# Patient Record
Sex: FEMALE | Employment: UNEMPLOYED
[De-identification: names, ages, dates, MRNs, and addresses within clinical notes are randomized per-mention and may not be internally consistent; named-entity substitution may affect disease eponyms.]

---

## 2024-02-19 ENCOUNTER — TRANSCRIBE ORDERS (OUTPATIENT)
Dept: OTHER | Age: 34
End: 2024-02-19

## 2024-02-19 DIAGNOSIS — Z31.41 FERTILITY TESTING: Primary | ICD-10-CM

## 2024-03-10 ENCOUNTER — HEALTH MAINTENANCE LETTER (OUTPATIENT)
Age: 34
End: 2024-03-10

## 2024-07-09 ENCOUNTER — TRANSCRIBE ORDERS (OUTPATIENT)
Dept: ORTHOPEDICS | Facility: CLINIC | Age: 34
End: 2024-07-09

## 2024-07-09 DIAGNOSIS — M54.2 NECK PAIN: Primary | ICD-10-CM

## 2024-07-23 ENCOUNTER — APPOINTMENT (OUTPATIENT)
Dept: URBAN - METROPOLITAN AREA CLINIC 259 | Age: 34
Setting detail: DERMATOLOGY
End: 2024-07-24

## 2024-07-23 DIAGNOSIS — B07.8 OTHER VIRAL WARTS: ICD-10-CM

## 2024-07-23 PROCEDURE — 17110 DESTRUCT B9 LESION 1-14: CPT

## 2024-07-23 PROCEDURE — OTHER SEPARATE AND IDENTIFIABLE DOCUMENTATION: OTHER

## 2024-07-23 PROCEDURE — OTHER PRESCRIPTION MEDICATION MANAGEMENT: OTHER

## 2024-07-23 PROCEDURE — OTHER PRESCRIPTION: OTHER

## 2024-07-23 PROCEDURE — OTHER MIPS QUALITY: OTHER

## 2024-07-23 PROCEDURE — OTHER COUNSELING: OTHER

## 2024-07-23 PROCEDURE — OTHER BENIGN DESTRUCTION: OTHER

## 2024-07-23 PROCEDURE — 99203 OFFICE O/P NEW LOW 30 MIN: CPT | Mod: 25

## 2024-07-23 RX ORDER — IMIQUIMOD 12.5 MG/.25G
CREAM TOPICAL
Qty: 24 | Refills: 2 | Status: ERX | COMMUNITY
Start: 2024-07-23

## 2024-07-23 ASSESSMENT — LOCATION DETAILED DESCRIPTION DERM
LOCATION DETAILED: LEFT MIDDLE FINGER DISTAL INTERPHALANGEAL JOINT
LOCATION DETAILED: LEFT DORSAL MIDDLE FINGER METACARPOPHALANGEAL JOINT
LOCATION DETAILED: LEFT PROXIMAL DORSAL MIDDLE FINGER

## 2024-07-23 ASSESSMENT — LOCATION SIMPLE DESCRIPTION DERM
LOCATION SIMPLE: LEFT HAND
LOCATION SIMPLE: LEFT MIDDLE FINGER

## 2024-07-23 ASSESSMENT — LOCATION ZONE DERM
LOCATION ZONE: HAND
LOCATION ZONE: FINGER

## 2024-07-23 NOTE — HPI: WARTS (VERRUCA)
How Severe Are Your Warts?: mild
Is This A New Presentation, Or A Follow-Up?: Warts
Treatment Number (Optional): 1
Additional History: Pt says her PCP froze the wart previously and it peeled off but came back. Pt requesting additional tx for the warts.

## 2024-07-23 NOTE — PROCEDURE: PRESCRIPTION MEDICATION MANAGEMENT
Plan: RTC in 1 month
Initiate Treatment: imiquimod 5 % topical cream packet QHS
Detail Level: Zone
Render In Strict Bullet Format?: No

## 2024-07-23 NOTE — PROCEDURE: BENIGN DESTRUCTION
Treatment Number (Will Not Render If 0): 0
Consent: The patient's consent was obtained including but not limited to risks of crusting, scabbing, blistering, scarring, darker or lighter pigmentary change, recurrence, incomplete removal and infection.
Medical Necessity Information: It is in your best interest to select a reason for this procedure from the list below. All of these items fulfill various CMS LCD requirements except the new and changing color options.
Add 52 Modifier (Optional): no
Anesthesia Volume In Cc: 0.5
Medical Necessity Clause: This procedure was medically necessary because the lesions that were treated were:
Render Post-Care Instructions In Note?: yes
Post-Care Instructions: I reviewed with the patient in detail post-care instructions. Patient is to wear sunprotection, and avoid picking at any of the treated lesions. Pt may apply Vaseline to crusted or scabbing areas.
Detail Level: Detailed

## 2024-09-20 ENCOUNTER — APPOINTMENT (OUTPATIENT)
Dept: URBAN - METROPOLITAN AREA CLINIC 259 | Age: 34
Setting detail: DERMATOLOGY
End: 2024-09-20

## 2024-09-20 ENCOUNTER — RX ONLY (RX ONLY)
Age: 34
End: 2024-09-20

## 2024-09-20 DIAGNOSIS — B07.8 OTHER VIRAL WARTS: ICD-10-CM

## 2024-09-20 PROCEDURE — OTHER BENIGN DESTRUCTION: OTHER

## 2024-09-20 PROCEDURE — OTHER PRESCRIPTION MEDICATION MANAGEMENT: OTHER

## 2024-09-20 PROCEDURE — OTHER COUNSELING: OTHER

## 2024-09-20 PROCEDURE — OTHER MIPS QUALITY: OTHER

## 2024-09-20 PROCEDURE — 17110 DESTRUCT B9 LESION 1-14: CPT

## 2024-09-20 RX ORDER — IMIQUIMOD 12.5 MG/.25G
CREAM TOPICAL
Qty: 24 | Refills: 1 | Status: ERX

## 2024-09-20 ASSESSMENT — LOCATION ZONE DERM
LOCATION ZONE: HAND
LOCATION ZONE: FINGER

## 2024-09-20 ASSESSMENT — LOCATION SIMPLE DESCRIPTION DERM
LOCATION SIMPLE: LEFT HAND
LOCATION SIMPLE: LEFT MIDDLE FINGER

## 2024-09-20 ASSESSMENT — LOCATION DETAILED DESCRIPTION DERM
LOCATION DETAILED: LEFT DORSAL MIDDLE FINGER METACARPOPHALANGEAL JOINT
LOCATION DETAILED: LEFT PROXIMAL DORSAL MIDDLE FINGER
LOCATION DETAILED: LEFT MIDDLE FINGER DISTAL INTERPHALANGEAL JOINT

## 2024-09-20 NOTE — PROCEDURE: PRESCRIPTION MEDICATION MANAGEMENT
Plan: RTC in 1 month
Detail Level: Zone
Continue Regimen: imiquimod 5 % topical cream packet QHS
Render In Strict Bullet Format?: No

## 2024-10-24 ENCOUNTER — MEDICAL CORRESPONDENCE (OUTPATIENT)
Dept: HEALTH INFORMATION MANAGEMENT | Facility: CLINIC | Age: 34
End: 2024-10-24
Payer: COMMERCIAL

## 2024-11-19 ENCOUNTER — THERAPY VISIT (OUTPATIENT)
Dept: PHYSICAL THERAPY | Facility: CLINIC | Age: 34
End: 2024-11-19
Attending: ORTHOPAEDIC SURGERY
Payer: COMMERCIAL

## 2024-11-19 DIAGNOSIS — M54.2 NECK PAIN: Primary | ICD-10-CM

## 2024-11-19 PROCEDURE — 97530 THERAPEUTIC ACTIVITIES: CPT | Mod: GP | Performed by: PHYSICAL THERAPIST

## 2024-11-19 PROCEDURE — 97161 PT EVAL LOW COMPLEX 20 MIN: CPT | Mod: GP | Performed by: PHYSICAL THERAPIST

## 2024-11-19 PROCEDURE — 97110 THERAPEUTIC EXERCISES: CPT | Mod: GP | Performed by: PHYSICAL THERAPIST

## 2024-11-19 NOTE — PROGRESS NOTES
PHYSICAL THERAPY EVALUATION  Type of Visit: Evaluation       Fall Risk Screen:  Fall screen completed by: PT  Have you fallen 2 or more times in the past year?: No  Have you fallen and had an injury in the past year?: No  Is patient a fall risk?: No    Subjective         Presenting condition or subjective complaint: Pain and tightness in the neck and shoulders since 10/2015  Date of onset:      Relevant medical history: Arthritis; Migraines or headaches; Osteoporosis; Pain at night or rest; Sleep disorder like apnea   Dates & types of surgery: CTS release L hand    Prior diagnostic imaging/testing results: MRI; CT scan; X-ray     Prior therapy history for the same diagnosis, illness or injury: No      Prior Level of Function  Transfers: Independent  Ambulation: Independent  ADL: Independent  IADL: Childcare, Housekeeping    Living Environment  Social support: With family members   Type of home: [facility]   Stairs to enter the home: Yes 2     Ramp:     Stairs inside the home: Yes 16 Is there a railing: No     Help at home: Self Cares (home health aide/personal care attendant, family, etc)  Equipment owned:       Employment: Yes    Hobbies/Interests:      Patient goals for therapy: reduce pain and stop numbness and tingling    Pain assessment: See objective evaluation for additional pain details     Objective   CERVICAL SPINE EVALUATION   Functional disability score (NDI):  42.22  VAS score (0-10): 7/10; at worst 8/10  Patient is R hand dominate    ADDITIONAL HISTORY:  Present symptoms:  B neck pain with B UT/posterior shoulder symptoms (L > R) with symptoms into the L scapula, into the L UE to dorsum of the wrist.  Intermittent L sided headaches.  Pain quality: Numb, Tingling, and heaviness  Paresthesia (yes/no):  no    Symptoms (improving/unchanging/worsening):  worsening over the past 2 months.    Symptoms commenced as a result of: No specific incident but has 3 special needs children.  In 2015, she fell on the ice  when pushing a wheel chair     Constant symptoms (neck/arm/forearm/headache): neck, shoulders,L UE/dorsum of L wrist, scapula  Intermittent symptoms (neck/arm/forearm/headache): headache    Symptoms are made worse with the following: Always Bending, Always, worse to the L  Turning, Always, supine  Lying, Always Rising, time of day - Always AM, Always On the move, and lifting, carrying, exerting herself   Symptoms are made better with the following: Sometimes When still, wearing her wrist brace, Ibuprofen, hot or cold shower    Disturbed sleep (yes/no): yes  Number of pillows: 1  Sleeping postures (prone/sup/side R/L): sides    Previous treatments: injection (beginning of Nov/end of Oct), muscle relaxant    Specific Questions: (as reported by the patient)  Dizziness/Tinnitus/Nausea/Swallowing (pos/neg): no  Gait/Upper Limbs (normal/abnormal): normal  Medications (nil/NSAIDS/anlag/steroids/anticoag/other):  NSAIDS, Muscle relaxants  General health (excellent/good/fair/poor):  good  Pertinent medical history:  anxiety, sleep disturbance, Osteoporosis, diabetes, low back pain   Imaging (None/Xray/MRI/Other):  MRI - disc involvement at C 4-5; EMG 2016  Recent or major surgery (yes/no): L hand surgery for CTS 2016  Night pain (yes/no): wakes at night due to neck pain and for other issues, too  Accidents (yes/no): not recent  Unexplained weight loss (yes/no): working on losing weight  Barriers at home: no  Other red flags: no    Postural Observation:   Sitting: Neutral  Protruded head: No Lateral deviation:  Nil.  Change of posture: No effect Wry Neck: Nil  Other observations/functional baselines:  increased cervical lordosis    Neurological:  Motor Deficit:  strong and symmetrical B UEs   Reflexes:  NT  Sensory Deficit:  NT     Neurodynamic tests:  Tightness for the R UE and pain L neck for ULTT on Rt.  (-) on L with L neck pain     Movement Loss:   Raza Mod Min Nil Symptoms   Protrusion    x Chest symptoms produced    Flexion    x L neck and UT   Retraction   x x chest symptoms produced   Extension   x  Central lower neck pain   Lateral flexion R    x Pull on L   Lateral flexion L   x x Inc L neck UT   Rotation R  x   Pull L UT   Rotation L  x   Pain L neck and shoulder     Test Movements:   During: produces, abolishes, increases, decreases, no effect, centralizing, peripheralizing  After: better, worse, no better, no worse, no effect, centralized, peripheralized    Symptomatic response Mechanical response    During testing After testing Effect - increased ROM, decreased ROM, or key functional test No Effect   Pretest symptoms sittin/10 neck B UT/posterior shoulders, L scapula, dorsal L wrist     Rep PRO       Rep RET increased neck, L UT/shoulder, ant chest  No Worse    Slight inc rotation R, no effect with L rotation     Rep RET EXT Increases    Worse     x            Pretest symptoms lying:     During testing After testing Effect - increased ROM, decreased ROM, or key functional test No Effect   Rep RET       Rep RET EXT         If required, pretest symptoms sitting:      During testing After testing Effect - increased ROM, decreased ROM, or key functional test No Effect   Rep LF-R       Rep LF-L       Rep ROT-R       Rep ROT-L       Rep FLEX         Static Tests:       Other Tests: not assessed    Provisional Classification:  Inconclusive/Other - Mechanically Inconclusive versus derangement (need to further assess at next session)    Potential Drivers of Pain and/or Disability: Comorbidities, duration of symptoms    Principle of Management:  Education:  discussed trial of using a rolled towel to support the neck when sleeping.  Discussed discontinue exercise if increase in L UE symptoms    Equipment provided:  none but discussed use of rolled towel to support neck when sleeping.  Mechanical therapy (Y/N):  yes   Extension principle:  repeated retraction 10 reps, goal 6-8 times a day   Lateral principle:  Flexion  principle:     Other:      Assessment & Plan   CLINICAL IMPRESSIONS  Medical Diagnosis: neck pain    Treatment Diagnosis: neck pain   Impression/Assessment: Patient is a 34 year old female with neck complaints.  The following significant findings have been identified: Pain, Decreased ROM/flexibility, Impaired muscle performance, Decreased activity tolerance, and Impaired posture. These impairments interfere with their ability to perform self care tasks, household chores, driving , and   as compared to previous level of function.     Clinical Decision Making (Complexity):  Clinical Presentation: Evolving/Changing  Clinical Presentation Rationale: based on medical and personal factors listed in PT evaluation  Clinical Decision Making (Complexity): Low complexity    PLAN OF CARE  Treatment Interventions:  Interventions: Manual Therapy, Neuromuscular Re-education, Therapeutic Activity, Therapeutic Exercise, Self-Care/Home Management    Long Term Goals     PT Goal 1  Goal Identifier: rotation  Goal Description: patient will be able to look over either shoulder to 75 deg without pain in the neck or shoulders  Rationale: to maximize safety and independence with performance of ADLs and functional tasks;to maximize safety and independence within the home;to maximize safety and independence within the community;to maximize safety and independence with transportation  Goal Progress: L rotation is more limited than R rotation; about 65 deg rot R; 60 deg L with pain on the L  Target Date: 01/14/25      Frequency of Treatment: 1 time a week  Duration of Treatment: 8 weeks    Recommended Referrals to Other Professionals:   Education Assessment:   Learner/Method: Patient;Pictures/Video;Demonstration;Reading;Listening  Education Comments: PTRx on phone    Risks and benefits of evaluation/treatment have been explained.   Patient/Family/caregiver agrees with Plan of Care.     Evaluation Time:     PT Eval, Low Complexity  Minutes (26810): 20  Evaluation Only     Signing Clinician: Ladonna Hernandez, PT        [facility]                                                                                   OUTPATIENT PHYSICAL THERAPY      PLAN OF TREATMENT FOR OUTPATIENT REHABILITATION   Patient's Last Name, First Name, Cher Gomes YOB: 1990   Provider's Name   [facility]   Medical Record No.  [id number]     Onset Date:    Start of Care Date: 11/19/24     Medical Diagnosis:  neck pain      PT Treatment Diagnosis:  neck pain Plan of Treatment  Frequency/Duration: 1 time a week/ 8 weeks    Certification date from 11/19/24 to 01/14/25         See note for plan of treatment details and functional goals     Ladonna Hernandez, PT                         I CERTIFY THE NEED FOR THESE SERVICES FURNISHED UNDER        THIS PLAN OF TREATMENT AND WHILE UNDER MY CARE .             Physician Signature               Date    X_____________________________________________________                  Referring Provider:  Gallito Manning    Initial Assessment  See Epic Evaluation- Start of Care Date: 11/19/24

## 2024-12-10 ENCOUNTER — THERAPY VISIT (OUTPATIENT)
Dept: PHYSICAL THERAPY | Facility: CLINIC | Age: 34
End: 2024-12-10
Payer: COMMERCIAL

## 2024-12-10 DIAGNOSIS — M54.2 NECK PAIN: Primary | ICD-10-CM

## 2024-12-10 PROCEDURE — 97110 THERAPEUTIC EXERCISES: CPT | Mod: GP | Performed by: PHYSICAL THERAPY ASSISTANT

## 2025-01-20 ENCOUNTER — MEDICAL CORRESPONDENCE (OUTPATIENT)
Dept: HEALTH INFORMATION MANAGEMENT | Facility: CLINIC | Age: 35
End: 2025-01-20
Payer: COMMERCIAL

## 2025-01-27 ENCOUNTER — PATIENT OUTREACH (OUTPATIENT)
Dept: CARE COORDINATION | Facility: CLINIC | Age: 35
End: 2025-01-27
Payer: COMMERCIAL

## 2025-01-29 ENCOUNTER — PATIENT OUTREACH (OUTPATIENT)
Dept: CARE COORDINATION | Facility: CLINIC | Age: 35
End: 2025-01-29
Payer: COMMERCIAL

## 2025-01-30 ENCOUNTER — HOSPITAL ENCOUNTER (EMERGENCY)
Facility: CLINIC | Age: 35
Discharge: HOME | End: 2025-01-30
Attending: STUDENT IN AN ORGANIZED HEALTH CARE EDUCATION/TRAINING PROGRAM
Payer: COMMERCIAL

## 2025-01-30 ENCOUNTER — APPOINTMENT (OUTPATIENT)
Dept: ULTRASOUND IMAGING | Facility: CLINIC | Age: 35
End: 2025-01-30
Attending: STUDENT IN AN ORGANIZED HEALTH CARE EDUCATION/TRAINING PROGRAM
Payer: COMMERCIAL

## 2025-01-30 VITALS
OXYGEN SATURATION: 99 % | SYSTOLIC BLOOD PRESSURE: 110 MMHG | RESPIRATION RATE: 17 BRPM | WEIGHT: 196.21 LBS | DIASTOLIC BLOOD PRESSURE: 74 MMHG | BODY MASS INDEX: 33.5 KG/M2 | HEART RATE: 73 BPM | TEMPERATURE: 98.8 F | HEIGHT: 64 IN

## 2025-01-30 DIAGNOSIS — K29.50 CHRONIC GASTRITIS WITHOUT BLEEDING, UNSPECIFIED GASTRITIS TYPE: ICD-10-CM

## 2025-01-30 DIAGNOSIS — K80.20 SYMPTOMATIC CHOLELITHIASIS: ICD-10-CM

## 2025-01-30 DIAGNOSIS — R10.10 UPPER ABDOMINAL PAIN: ICD-10-CM

## 2025-01-30 LAB
ALBUMIN SERPL BCG-MCNC: 4.3 G/DL (ref 3.5–5.2)
ALP SERPL-CCNC: 91 U/L (ref 40–150)
ALT SERPL W P-5'-P-CCNC: 32 U/L (ref 0–50)
ANION GAP SERPL CALCULATED.3IONS-SCNC: 12 MMOL/L (ref 7–15)
AST SERPL W P-5'-P-CCNC: 36 U/L (ref 0–45)
BASOPHILS # BLD AUTO: 0 10E3/UL (ref 0–0.2)
BASOPHILS NFR BLD AUTO: 0 %
BILIRUB SERPL-MCNC: 0.5 MG/DL
BUN SERPL-MCNC: 11.7 MG/DL (ref 6–20)
CALCIUM SERPL-MCNC: 9.2 MG/DL (ref 8.8–10.4)
CHLORIDE SERPL-SCNC: 104 MMOL/L (ref 98–107)
CREAT SERPL-MCNC: 0.64 MG/DL (ref 0.51–0.95)
EGFRCR SERPLBLD CKD-EPI 2021: >90 ML/MIN/1.73M2
EOSINOPHIL # BLD AUTO: 0.1 10E3/UL (ref 0–0.7)
EOSINOPHIL NFR BLD AUTO: 1 %
ERYTHROCYTE [DISTWIDTH] IN BLOOD BY AUTOMATED COUNT: 12.6 % (ref 10–15)
GLUCOSE SERPL-MCNC: 85 MG/DL (ref 70–99)
HCG SERPL QL: NEGATIVE
HCO3 SERPL-SCNC: 21 MMOL/L (ref 22–29)
HCT VFR BLD AUTO: 37.7 % (ref 35–47)
HGB BLD-MCNC: 12.8 G/DL (ref 11.7–15.7)
IMM GRANULOCYTES # BLD: 0 10E3/UL
IMM GRANULOCYTES NFR BLD: 0 %
LIPASE SERPL-CCNC: 51 U/L (ref 13–60)
LYMPHOCYTES # BLD AUTO: 2 10E3/UL (ref 0.8–5.3)
LYMPHOCYTES NFR BLD AUTO: 20 %
MCH RBC QN AUTO: 28.8 PG (ref 26.5–33)
MCHC RBC AUTO-ENTMCNC: 34 G/DL (ref 31.5–36.5)
MCV RBC AUTO: 85 FL (ref 78–100)
MONOCYTES # BLD AUTO: 0.6 10E3/UL (ref 0–1.3)
MONOCYTES NFR BLD AUTO: 6 %
NEUTROPHILS # BLD AUTO: 7.2 10E3/UL (ref 1.6–8.3)
NEUTROPHILS NFR BLD AUTO: 73 %
NRBC # BLD AUTO: 0 10E3/UL
NRBC BLD AUTO-RTO: 0 /100
PLATELET # BLD AUTO: 299 10E3/UL (ref 150–450)
POTASSIUM SERPL-SCNC: 3.9 MMOL/L (ref 3.4–5.3)
PROT SERPL-MCNC: 7.4 G/DL (ref 6.4–8.3)
RBC # BLD AUTO: 4.45 10E6/UL (ref 3.8–5.2)
SODIUM SERPL-SCNC: 137 MMOL/L (ref 135–145)
TROPONIN T SERPL HS-MCNC: <6 NG/L
WBC # BLD AUTO: 10 10E3/UL (ref 4–11)

## 2025-01-30 PROCEDURE — 99284 EMERGENCY DEPT VISIT MOD MDM: CPT | Performed by: STUDENT IN AN ORGANIZED HEALTH CARE EDUCATION/TRAINING PROGRAM

## 2025-01-30 PROCEDURE — 250N000009 HC RX 250: Performed by: STUDENT IN AN ORGANIZED HEALTH CARE EDUCATION/TRAINING PROGRAM

## 2025-01-30 PROCEDURE — 99284 EMERGENCY DEPT VISIT MOD MDM: CPT | Mod: 25 | Performed by: STUDENT IN AN ORGANIZED HEALTH CARE EDUCATION/TRAINING PROGRAM

## 2025-01-30 PROCEDURE — 82040 ASSAY OF SERUM ALBUMIN: CPT | Performed by: STUDENT IN AN ORGANIZED HEALTH CARE EDUCATION/TRAINING PROGRAM

## 2025-01-30 PROCEDURE — 36415 COLL VENOUS BLD VENIPUNCTURE: CPT | Performed by: STUDENT IN AN ORGANIZED HEALTH CARE EDUCATION/TRAINING PROGRAM

## 2025-01-30 PROCEDURE — 250N000013 HC RX MED GY IP 250 OP 250 PS 637: Performed by: STUDENT IN AN ORGANIZED HEALTH CARE EDUCATION/TRAINING PROGRAM

## 2025-01-30 PROCEDURE — 83690 ASSAY OF LIPASE: CPT | Performed by: STUDENT IN AN ORGANIZED HEALTH CARE EDUCATION/TRAINING PROGRAM

## 2025-01-30 PROCEDURE — 85025 COMPLETE CBC W/AUTO DIFF WBC: CPT | Performed by: STUDENT IN AN ORGANIZED HEALTH CARE EDUCATION/TRAINING PROGRAM

## 2025-01-30 PROCEDURE — 84484 ASSAY OF TROPONIN QUANT: CPT | Performed by: STUDENT IN AN ORGANIZED HEALTH CARE EDUCATION/TRAINING PROGRAM

## 2025-01-30 PROCEDURE — 76705 ECHO EXAM OF ABDOMEN: CPT

## 2025-01-30 PROCEDURE — 84703 CHORIONIC GONADOTROPIN ASSAY: CPT | Performed by: STUDENT IN AN ORGANIZED HEALTH CARE EDUCATION/TRAINING PROGRAM

## 2025-01-30 PROCEDURE — 84155 ASSAY OF PROTEIN SERUM: CPT | Performed by: STUDENT IN AN ORGANIZED HEALTH CARE EDUCATION/TRAINING PROGRAM

## 2025-01-30 RX ORDER — SUCRALFATE 1 G/1
1 TABLET ORAL 4 TIMES DAILY PRN
Qty: 15 TABLET | Refills: 0 | Status: SHIPPED | OUTPATIENT
Start: 2025-01-30

## 2025-01-30 RX ORDER — MAGNESIUM HYDROXIDE/ALUMINUM HYDROXICE/SIMETHICONE 120; 1200; 1200 MG/30ML; MG/30ML; MG/30ML
15 SUSPENSION ORAL ONCE
Status: COMPLETED | OUTPATIENT
Start: 2025-01-30 | End: 2025-01-30

## 2025-01-30 RX ORDER — OXYCODONE HYDROCHLORIDE 5 MG/1
5 TABLET ORAL EVERY 6 HOURS PRN
Qty: 12 TABLET | Refills: 0 | Status: SHIPPED | OUTPATIENT
Start: 2025-01-30 | End: 2025-02-02

## 2025-01-30 RX ORDER — LIDOCAINE HYDROCHLORIDE 20 MG/ML
5 SOLUTION OROPHARYNGEAL ONCE
Status: COMPLETED | OUTPATIENT
Start: 2025-01-30 | End: 2025-01-30

## 2025-01-30 RX ADMIN — LIDOCAINE HYDROCHLORIDE 5 ML: 20 SOLUTION ORAL at 13:41

## 2025-01-30 RX ADMIN — ALUMINUM HYDROXIDE, MAGNESIUM HYDROXIDE, AND SIMETHICONE 15 ML: 200; 200; 20 SUSPENSION ORAL at 13:41

## 2025-01-30 ASSESSMENT — COLUMBIA-SUICIDE SEVERITY RATING SCALE - C-SSRS
1. IN THE PAST MONTH, HAVE YOU WISHED YOU WERE DEAD OR WISHED YOU COULD GO TO SLEEP AND NOT WAKE UP?: NO
2. HAVE YOU ACTUALLY HAD ANY THOUGHTS OF KILLING YOURSELF IN THE PAST MONTH?: NO
6. HAVE YOU EVER DONE ANYTHING, STARTED TO DO ANYTHING, OR PREPARED TO DO ANYTHING TO END YOUR LIFE?: NO

## 2025-01-30 ASSESSMENT — ACTIVITIES OF DAILY LIVING (ADL)
ADLS_ACUITY_SCORE: 41
ADLS_ACUITY_SCORE: 41

## 2025-01-30 NOTE — ED TRIAGE NOTES
Sudden abdominal pain to middle of abdomen around 1100 today.  Ddi receive 100mcg of fentanyl via IV in route

## 2025-01-30 NOTE — ED NOTES
Patient requesting Uber. Advised we do not have Uber services in [place] that writer knows of, but have list of transportation options. Asked if she is paying for the cab, patient states her  will pay once she gets to the house. Unable to get a hold of [employer] [facility] [place] (patient is [place] resident), tried x3. Called [employer] @ [phone] and they will be sending a  out. Advised them that patient will be private pay. Patient discharged to lobby to wait for cab, registration notified of patient and that they are awaiting a cab, as well as primarily speaking Dive[place].

## 2025-01-30 NOTE — ED PROVIDER NOTES
History     Chief Complaint   Patient presents with    Abdominal Pain     HPI  Cher Malone is a 34 year old female with history of previous  who presents for evaluation of upper abdominal pain.  Patient speaks English and a  is used to obtain history.  She describes having fairly sudden onset of epigastric discomfort around 1030 this morning.  Pain has gradually worsened over time and during the peak of her discomfort she describes feeling sweaty, short of breath, and very anxious.  Patient states that she has been worked up for similar pain at [place] years ago.  She describes likely having H. pylori breath testing and she later underwent an endoscopy that showed some irritation to the stomach lining.  With previous episodes of this pain she has taken omeprazole and an antihistamine, but doing so today did not provide any significant relief.  For this reason she called EMS and was given 100 mcg of fentanyl during transport.  This improved intensity of pain from 10 down to 7.  Patient denies having any fevers or chills.  The discomfort still radiates into her back, but she denies having any chest pain or shortness of breath now.  No previous cardiac history or abdominal surgeries aside from the .  Patient denies any possibility of pregnancy.  She also denies having any vomiting, lower extremity edema or pain, changes in bowel or urinary habits, other complaints today.    Allergies:  Allergies   Allergen Reactions    Flagyl [Metronidazole] Rash     Problem List:    Patient Active Problem List    Diagnosis Date Noted    Neck pain 2024     Priority: Medium      Past Medical History:    No past medical history on file.    Past Surgical History:    No past surgical history on file.    Family History:    No family history on file.    Social History:  Marital Status:   [2]        Medications:    No current outpatient medications on file.    Review of Systems   All other systems  "reviewed and are negative.  See HPI.    Physical Exam   BP: 119/77  Pulse: 86  Temp: 98.8  F (37.1  C)  Resp: 17  Height: 162.6 cm (5' 4\")  Weight: 89 kg (196 lb 3.4 oz)  SpO2: 100 %    Physical Exam  Vitals and nursing note reviewed.   Constitutional:       General: She is not in acute distress.     Appearance: Normal appearance. She is well-developed. She is not diaphoretic.   HENT:      Head: Atraumatic.      Mouth/Throat:      Mouth: Mucous membranes are moist.   Eyes:      General: No scleral icterus.     Conjunctiva/sclera: Conjunctivae normal.   Cardiovascular:      Rate and Rhythm: Normal rate and regular rhythm.      Heart sounds: Normal heart sounds. No murmur heard.  Pulmonary:      Effort: Pulmonary effort is normal. No respiratory distress.      Breath sounds: No wheezing or rhonchi.   Abdominal:      General: Abdomen is flat.      Tenderness: There is abdominal tenderness in the right upper quadrant and epigastric area. There is no right CVA tenderness, left CVA tenderness, guarding or rebound. Negative signs include Santacruz's sign.   Musculoskeletal:      Cervical back: Neck supple.   Skin:     General: Skin is warm.      Capillary Refill: Capillary refill takes less than 2 seconds.      Coloration: Skin is not pale.      Findings: No rash.   Neurological:      General: No focal deficit present.      Mental Status: She is alert and oriented to person, place, and time.   Psychiatric:         Mood and Affect: Mood normal.       ED Course        Procedures            No results found for this or any previous visit (from the past 24 hours).    Medications - No data to display    Assessments & Plan (with Medical Decision Making)     I have reviewed the nursing notes.    I have reviewed the findings, diagnosis, plan and need for follow up with the patient.  Medical Decision Making  Cher Malone is a 34 year old female with history of previous  who presents for evaluation of upper abdominal pain.  " ***    New Prescriptions    No medications on file     Final diagnoses:   None     1/30/2025   [facility] EMERGENCY DEPT     2025    INDICATION: Epigastric pain, ? gallstones.  COMPARISON: CT abdomen pelvis 2023  TECHNIQUE: Limited abdominal ultrasound.    FINDINGS:    GALLBLADDER: Gallstones measuring 2.8 cm. No significant wall thickening or pericholecystic fluid. Limited evaluation for Santacruz's sign given recent pain medication.    BILE DUCTS: No biliary dilatation. The common duct measures 3 mm.    LIVER: Unremarkable parenchyma with smooth contour. No focal mass.    RIGHT KIDNEY: No hydronephrosis.    PANCREAS: The visualized portions are normal.    No ascites.      Impression    IMPRESSION:  1.  Cholelithiasis without secondary signs of acute cholecystitis.           Medications   alum & mag hydroxide-simethicone (MAALOX) suspension 15 mL (15 mLs Oral $Given 25 1341)   lidocaine (viscous) (XYLOCAINE) 2 % solution 5 mL (5 mLs Mouth/Throat $Given 25 1341)       Assessments & Plan (with Medical Decision Making)     I have reviewed the nursing notes.    I have reviewed the findings, diagnosis, plan and need for follow up with the patient.  Medical Decision Making  Cher Malone is a 34 year old female with history of previous  who presents for evaluation of upper abdominal pain.  Normal vitals.  Patient appears slightly uncomfortable but in no acute distress.  She states that pain has largely resolved, but it was severe earlier and caused some associated symptoms including temporary shortness of breath and lightheadedness.  Exam today is significant for mild to moderate epigastric and right upper quadrant tenderness, though Santacruz sign is negative and abdomen remains soft.  Differential would include gastritis/peptic ulcer disease or more likely cholelithiasis given sudden/severe onset of pain in contrast and lack of response to existing medications.  Less likely to represent any cardiopulmonary source given age and risk profile.  Patient received fentanyl from EMS during transport, but also had further  improvement of discomfort with a GI cocktail.    Ultrasound showed gallstones but no acute signs of cholecystitis.  Labs were normal with no leukocytosis or anemia, normal electrolytes, transaminases, and lipase.  Troponin was negative as well.  Because of symptoms is still a bit unclear as to whether it is from gallstones or gastritis, could certainly be a combination of both.  For this reason I will refer her to general surgery for consideration of endoscopy and cholecystectomy, but I do not think this needs to happen emergently today without signs of acute infection.  I suggested that the patient should continue taking the PPI twice daily.  We will also prescribe both Carafate and oxycodone for breakthrough discomfort.  Patient will follow-up as soon as possible and agrees to return sooner for any new or worsening symptoms.    Discharge Medication List as of 1/30/2025  3:18 PM        START taking these medications    Details   oxyCODONE (ROXICODONE) 5 MG tablet Take 1 tablet (5 mg) by mouth every 6 hours as needed for pain., Disp-12 tablet, R-0, E-Prescribe      sucralfate (CARAFATE) 1 GM tablet Take 1 tablet (1 g) by mouth 4 times daily as needed., Disp-15 tablet, R-0, E-Prescribe           Final diagnoses:   Symptomatic cholelithiasis   Chronic gastritis without bleeding, unspecified gastritis type   Upper abdominal pain     1/30/2025   [facility] EMERGENCY DEPT       Tiago Bains MD  01/31/25 2160

## 2025-01-30 NOTE — ED NOTES
Bed: ED09  Expected date: 1/30/25  Expected time: 12:55 PM  Means of arrival:   Comments:  [facility]

## 2025-01-30 NOTE — Clinical Note
Cher Malone was seen and treated in our emergency department on 1/30/2025.  She may return to work on 02/02/2025.       If you have any questions or concerns, please don't hesitate to call.      Tiago Bains MD

## 2025-01-30 NOTE — ED TRIAGE NOTES
took omeprazole 20mg and 2 dose anti histamines which normally helps but did not today. Similar episode 4 years ago.

## 2025-01-30 NOTE — DISCHARGE INSTRUCTIONS
I think your symptoms are probably due to gallstones.  Fortunately I do not see any signs of infection or inflammation to the gallbladder, liver, or pancreas.    It is also possible that your pain is from irritation to the stomach lining.  I recommend that you continue taking the omeprazole twice daily.    We will also prescribe 2 separate medications that you can try as needed for breakthrough pain.  You can try the Carafate if it feels more like stomach irritation or the oxycodone for more severe pain.    Make a follow-up appointment with general surgery.  They will evaluate you and decide if you need another endoscopy or to schedule removal of your gallbladder.    Return to the emergency department sooner for any new or worsening symptoms, especially severe/sustained pain, vomiting, fevers.    The following text was generated using online translation services of the above instructions:    ????? ?? ?????? ???? ???? ???? ????? ???????.  ????? ????? ?? ??? ?? ?????? ???? ?? ?????? ?? ??????? ?? ????? ?? ?????????.    ??? ?????? ????? ?? ???? ???? ?????? ?? ???? ????? ??????.  ????? ??????? ????? ???????????? ????? ??????.    ???? ????? ?????? ??????? ????? ???????? ??? ?????? ????? ????? ?????????.  ????? ????? Carafate ??? ??? ???? ????? ?????? ?? ???????????? ???? ????.    ????? ???? ???????? ?? ??????? ??????.  ??????? ??????? ?????? ?? ??? ??? ????? ??? ????? ????? ??? ?? ????? ???? ?????? ???????.    ?? ??????? ??? ??? ??????? ?????? ?? ???? ???? ?? ????? ????? ?? ???????? ????? ????? ?????? / ???????? ??????? ??????.    'aetaqid 'iqra 'aeradak rubama takun bisabab hasawat almararati. wing jacobs 'araa 'aya ealamat eadwaa 'aw ailtihab fi almararat 'aw alkabid 'aw albinkiryasi.  wamin almpablo hernandesdan 'an yakun 'alimuk natjan fer tahayuj bitanat almaeidati. 'ansahuk bimuasalat tanawul al'uwmibrazul maratayn ywmyan.  sanasif shilo flood'cecilion munfasilayn yumjulia alexanderjriadolfo ingramab alhajaivette lieilaj al'alam  jame. anil bush Carandreas 'iidha jarrodt edwinheralph medina 'aw al'shannan henry'adwoa ng.  tahdid maweid jazmínmutdavid zaragoza. sayaqumxochitl conleyyimiangi callejas ma 'iidha jarrodt bihajaivette 'iilaa tanzir eitan henryhar 'aw tahdid maweid carl'coryzalety george.  qum faizan 'iilaa dessm kam botello fi halat zuhralph 'ayi 'aerad raheeldaivette 'aw mikel bender'adwoa bonilla / arturo lovett', walWright-Patterson Medical Centerjulia.

## 2025-02-06 ENCOUNTER — TRANSFERRED RECORDS (OUTPATIENT)
Dept: MULTI SPECIALTY CLINIC | Facility: CLINIC | Age: 35
End: 2025-02-06
Payer: COMMERCIAL

## 2025-02-06 LAB
CREATININE (EXTERNAL): 0.64 MG/DL (ref 0.57–1)
GFR ESTIMATED (EXTERNAL): 118 ML/MIN/1.73
GLUCOSE (EXTERNAL): 81 MG/DL (ref 70–99)
INR (EXTERNAL): 0.9 (ref 0.9–1.2)
POTASSIUM (EXTERNAL): 4.6 MMOL/L (ref 3.5–5.2)

## 2025-02-10 PROBLEM — M54.2 NECK PAIN: Status: RESOLVED | Noted: 2024-11-19 | Resolved: 2025-02-10

## 2025-03-10 NOTE — TELEPHONE ENCOUNTER
REASON FOR VISIT: chronic neck pain    DATE OF APPT: 4/08/2025   NOTES (FOR ALL VISITS) STATUS DETAILS   OFFICE NOTE from referring provider Received [facility]  Asif Alex, CLAYTON CNP 1/24/2025   MEDICATION LIST N/A    IMAGING  (FOR ALL VISITS)     XR N/A    MRI (HEAD, NECK, SPINE) N/A    CT (HEAD, NECK, SPINE) N/A

## 2025-03-16 ENCOUNTER — HEALTH MAINTENANCE LETTER (OUTPATIENT)
Age: 35
End: 2025-03-16

## 2025-04-08 ENCOUNTER — PRE VISIT (OUTPATIENT)
Dept: NEUROSURGERY | Facility: CLINIC | Age: 35
End: 2025-04-08

## 2025-06-24 NOTE — PROGRESS NOTES
"  SUBJECTIVE:    Cher Malone  Is a 34 year old female who presents for new patient evaluation of chronic neck pain upon referral from nurse practitioner Asif Alex.  No notes available.    An  was present (/virtually) throughout the visit today. After discussing my assessment and treatment recommendations including risks and benefits with the patient/, I asked the patient / family / parents to convey what they understood about my assessment and recommendations to ensure understanding. The communication back from the patient, as relayed through the , confirmed understanding. The patient  was also given time to have all questions answered.    Since 2016 she has had pain in the neck occiput anterior and posterior upper chest and diffusely throughout the right arm.  There is a bit of a language barrier even with the help of an .  She has essentially a positive review of systems for everything with the exception of bowel and bladder control issues.  The onset of symptoms is associated with an event in 2016 when she was pushing one of her children in a wheelchair in front of her and fell down the steps landing on top of the wheelchair with her chest striking the wheelchair.  In [place] she was seeing a doctor who did an MRI in 2016 and she is not sure what the results were.  This was repeated at [place] [facility] in August 2024 and at this point I am awaiting those results.  She was told that there was something wrong with the muscles of the bones at \"4 or 5\".  She saw surgeon who did not recommend surgery and advised 6 weeks of physical therapy which she did without success.  An injection was discussed but never done or ordered.  She describes bilateral upper quadrant cramping and she uses ibuprofen and Tylenol.  She tried using muscle relaxers and's and stopped them and was at one point given a prescription for oxycodone which she did not use.  She has not had any chiropractic " "care.    SYMPTOMS WORSENED WITH fatigue, taking care of her 3 disabled children.    SYMPTOMS IMPROVED WITH rest    Pain score, and diagram reviewed.  See questionnaire.      Medications:  Reviewed.    Allergies Reviewed.      Past medical and surgical history:    Nothing pertinent    Social History: She is originally from [place] with marginal English proficiency.  She has 3 children all of whom have a genetic condition that renders them significantly disabled both mentally and cognitively.  Ages 6, 16, and 20 and she has to do \"everything\" for them.  This involves lifting them as well.  This aggravates her symptoms.    OBJECTIVE:    IMAGING: Images and report reviewed      DX CERVICAL SPINE 2-3 VIEWS 6/17/2021 ([facility])    Impression    Straightening of the normal cervical lordosis. Low-grade  anterolisthesis of C3 on C4 and C4 on C5. Mild marginal hypertrophic changes at the C5 and C6 interspaces, which have minimally progressed since 5/24/18. Mild degenerative narrowing of the upper cervical interspaces.    PHYSICAL EXAMINATION:  As I was talking to her at the 1 hour portion of our visit, she suddenly got up took off her gown got dressed and I asked why she was dressing before I could do the exam and she stated that she had \"an emergency\" and that perhaps we could reschedule and do another assessment at another time.  As result no exam was done.    ASSESSMENT:     Cher Malone is a 34 year old female who presents today for new patient evaluation of   Chronic neck and upper back pain  Incomplete evaluation because she left before I could examine her.      DISCUSSION/PLAN:    Return to clinic if she wishes to complete the evaluation.      60 minutes of time spent on the date of the encounter doing chart review, history and exam, documentation, counseling, education, coordination of care, and other activities as described above.        Please note: Voice recognition software was used in this dictation.  It may " therefore contain typographical errors.  Oliver Richter MD

## 2025-07-15 ENCOUNTER — OFFICE VISIT (OUTPATIENT)
Dept: NEUROSURGERY | Facility: CLINIC | Age: 35
End: 2025-07-15
Payer: COMMERCIAL

## 2025-07-15 VITALS
WEIGHT: 178.57 LBS | SYSTOLIC BLOOD PRESSURE: 102 MMHG | DIASTOLIC BLOOD PRESSURE: 70 MMHG | HEIGHT: 64 IN | BODY MASS INDEX: 30.49 KG/M2 | HEART RATE: 77 BPM

## 2025-07-15 DIAGNOSIS — M54.2 CERVICALGIA: ICD-10-CM

## 2025-07-15 RX ORDER — IBUPROFEN 800 MG/1
800 TABLET, FILM COATED ORAL EVERY 8 HOURS PRN
COMMUNITY

## 2025-07-15 ASSESSMENT — PAIN SCALES - GENERAL: PAINLEVEL_OUTOF10: SEVERE PAIN (7)

## 2025-07-15 NOTE — LETTER
"7/15/2025      Cher Malone  [address] [facility] [address]      Dear Colleague,    Thank you for referring your patient, Cher Malone, to the [facility] [place]. Please see a copy of my visit note below.      SUBJECTIVE:    Cher Malone  Is a 34 year old female who presents for new patient evaluation of chronic neck pain upon referral from nurse practitioner Asif Alex.  No notes available.    An  was present (/virtually) throughout the visit today. After discussing my assessment and treatment recommendations including risks and benefits with the patient/, I asked the patient / family / parents to convey what they understood about my assessment and recommendations to ensure understanding. The communication back from the patient, as relayed through the , confirmed understanding. The patient  was also given time to have all questions answered.    Since 2016 she has had pain in the neck occiput anterior and posterior upper chest and diffusely throughout the right arm.  There is a bit of a language barrier even with the help of an .  She has essentially a positive review of systems for everything with the exception of bowel and bladder control issues.  The onset of symptoms is associated with an event in 2016 when she was pushing one of her children in a wheelchair in front of her and fell down the steps landing on top of the wheelchair with her chest striking the wheelchair.  In [place] she was seeing a doctor who did an MRI in 2016 and she is not sure what the results were.  This was repeated at [place] [facility] in August 2024 and at this point I am awaiting those results.  She was told that there was something wrong with the muscles of the bones at \"4 or 5\".  She saw surgeon who did not recommend surgery and advised 6 weeks of physical therapy which she did without success.  An injection was discussed but never done or ordered.  " "She describes bilateral upper quadrant cramping and she uses ibuprofen and Tylenol.  She tried using muscle relaxers and's and stopped them and was at one point given a prescription for oxycodone which she did not use.  She has not had any chiropractic care.    SYMPTOMS WORSENED WITH fatigue, taking care of her 3 disabled children.    SYMPTOMS IMPROVED WITH rest    Pain score, and diagram reviewed.  See questionnaire.      Medications:  Reviewed.    Allergies Reviewed.      Past medical and surgical history:    Nothing pertinent    Social History: She is originally from [place] with marginal English proficiency.  She has 3 children all of whom have a genetic condition that renders them significantly disabled both mentally and cognitively.  Ages 6, 16, and 20 and she has to do \"everything\" for them.  This involves lifting them as well.  This aggravates her symptoms.    OBJECTIVE:    IMAGING: Images and report reviewed      DX CERVICAL SPINE 2-3 VIEWS 6/17/2021 ([facility])    Impression    Straightening of the normal cervical lordosis. Low-grade  anterolisthesis of C3 on C4 and C4 on C5. Mild marginal hypertrophic changes at the C5 and C6 interspaces, which have minimally progressed since 5/24/18. Mild degenerative narrowing of the upper cervical interspaces.    PHYSICAL EXAMINATION:  As I was talking to her at the 1 hour portion of our visit, she suddenly got up took off her gown got dressed and I asked why she was dressing before I could do the exam and she stated that she had \"an emergency\" and that perhaps we could reschedule and do another assessment at another time.  As result no exam was done.    ASSESSMENT:     Cher Malone is a 34 year old female who presents today for new patient evaluation of   Chronic neck and upper back pain  Incomplete evaluation because she left before I could examine her.      DISCUSSION/PLAN:    Return to clinic if she wishes to complete the evaluation.      60 minutes of time spent " on the date of the encounter doing chart review, history and exam, documentation, counseling, education, coordination of care, and other activities as described above.        Please note: Voice recognition software was used in this dictation.  It may therefore contain typographical errors.  Oliver Richter MD      Again, thank you for allowing me to participate in the care of your patient.        Sincerely,        Oliver Richter MD    Electronically signed

## 2025-07-15 NOTE — NURSING NOTE
"Reason For Visit:   Chief Complaint   Patient presents with    Consult     Neck pain         Occupation: Stay at home Mom  Currently working? No.  Work status?  none.    Sports: n  Activities: walking             /70   Pulse 77   Ht 1.626 m (5' 4\")   Wt 81 kg (178 lb 9.2 oz)   BMI 30.65 kg/m        Allergies   Allergen Reactions    Flagyl [Metronidazole] Rash       Current Outpatient Medications   Medication Sig Dispense Refill    ibuprofen (ADVIL/MOTRIN) 800 MG tablet Take 800 mg by mouth every 8 hours as needed for moderate pain.      sucralfate (CARAFATE) 1 GM tablet Take 1 tablet (1 g) by mouth 4 times daily as needed. (Patient not taking: Reported on 7/15/2025) 15 tablet 0     No current facility-administered medications for this visit.         Darla Severin-Brown, LPN   "